# Patient Record
Sex: MALE | Race: WHITE | Employment: OTHER | URBAN - NONMETROPOLITAN AREA
[De-identification: names, ages, dates, MRNs, and addresses within clinical notes are randomized per-mention and may not be internally consistent; named-entity substitution may affect disease eponyms.]

---

## 2020-09-15 PROBLEM — E66.812 CLASS 2 SEVERE OBESITY DUE TO EXCESS CALORIES WITH SERIOUS COMORBIDITY AND BODY MASS INDEX (BMI) OF 36.0 TO 36.9 IN ADULT: Status: ACTIVE | Noted: 2020-09-15

## 2020-12-15 PROBLEM — E66.812 CLASS 2 SEVERE OBESITY DUE TO EXCESS CALORIES WITH SERIOUS COMORBIDITY AND BODY MASS INDEX (BMI) OF 36.0 TO 36.9 IN ADULT: Chronic | Status: ACTIVE | Noted: 2020-09-15

## 2024-10-22 ENCOUNTER — OFFICE VISIT (OUTPATIENT)
Age: 69
End: 2024-10-22

## 2024-10-22 VITALS — HEIGHT: 73 IN | WEIGHT: 260 LBS | BODY MASS INDEX: 34.46 KG/M2

## 2024-10-22 DIAGNOSIS — M85.832 OSTEOPENIA OF LEFT FOREARM: Primary | ICD-10-CM

## 2024-10-22 RX ORDER — LEVOTHYROXINE SODIUM 25 UG/1
12.5 TABLET ORAL DAILY
COMMUNITY
Start: 2024-10-19

## 2024-10-22 RX ORDER — DEXTROAMPHETAMINE SACCHARATE, AMPHETAMINE ASPARTATE, DEXTROAMPHETAMINE SULFATE AND AMPHETAMINE SULFATE 2.5; 2.5; 2.5; 2.5 MG/1; MG/1; MG/1; MG/1
10 TABLET ORAL DAILY
COMMUNITY
Start: 2024-10-05

## 2024-10-22 ASSESSMENT — ENCOUNTER SYMPTOMS
DIARRHEA: 0
VOMITING: 0
SHORTNESS OF BREATH: 0
NAUSEA: 0
COUGH: 0
BLOOD IN STOOL: 0
CONSTIPATION: 0
COLOR CHANGE: 0

## 2024-10-22 NOTE — PROGRESS NOTES
[facility]  [facility]  [address]  [facility] [id number] [phone]     Patient: Parveen Masters JR   YOB: 1955   Date: 10/22/2024   Visit Type:      History of Present Illness  Chief Complaint   Patient presents with    Osteoporosis     Bone density results       This is a 69 y.o. male presents today for follow-up of bone mineral density testing.  He did have the bone mineral density test completed at [facility].  He comes in today for review.  He is currently supplementing with calcium and vitamin D.  Denies any recent complaints.  Denies any recent trauma, injury, falls, fractures.  His bone density test was at [facility] on-9/17/2024.  He is currently doing a boot camp 2 to 3 days a week at the gym.  He is active.    Past Medical History:   Diagnosis Date    Allergic rhinitis     Anxiety     Bipolar disorder in full remission ([facility]) 9/15/2020    Chronic back pain     GERD (gastroesophageal reflux disease)     Hypertension     Neuropathy     Obesity     Osteoarthritis       Past Surgical History:   Procedure Laterality Date    CHG FLUOROSCOPIC GUIDANCE NEEDLE PLACEMENT ADD ON Left 7/27/2018    INTRA-ARTICULAR HIP INJECTION performed by Tristan Valladares MD at [facility] OR    JOINT REPLACEMENT      TOTAL HIP ARTHROPLASTY  11/27/2018      Social History     Socioeconomic History    Marital status:      Spouse name: None    Number of children: None    Years of education: None    Highest education level: None   Tobacco Use    Smoking status: Never    Smokeless tobacco: Never   Vaping Use    Vaping status: Never Used   Substance and Sexual Activity    Alcohol use: No    Drug use: No    Sexual activity: Yes     Partners: Female     Social Determinants of Health     Financial Resource Strain: Low Risk  (4/19/2021)    Overall Financial Resource Strain (CARDIA)     Difficulty of Paying Living Expenses:

## 2024-10-22 NOTE — PROGRESS NOTES
Chief Complaint    Chief Complaint   Patient presents with    Osteoporosis     Bone density results              Review of Systems    Review of Systems   Constitutional:  Negative for appetite change, chills, fatigue and fever.   Respiratory:  Negative for cough and shortness of breath.    Cardiovascular:  Negative for chest pain, palpitations and leg swelling.   Gastrointestinal:  Negative for blood in stool, constipation, diarrhea, nausea and vomiting.   Musculoskeletal:  Negative for arthralgias, gait problem and joint swelling.   Skin:  Negative for color change.   Neurological:  Negative for weakness.   All other systems reviewed and are negative.